# Patient Record
Sex: FEMALE | Race: WHITE | Employment: UNEMPLOYED | URBAN - METROPOLITAN AREA
[De-identification: names, ages, dates, MRNs, and addresses within clinical notes are randomized per-mention and may not be internally consistent; named-entity substitution may affect disease eponyms.]

---

## 2019-06-06 ENCOUNTER — OFFICE VISIT (OUTPATIENT)
Dept: OBGYN | Age: 59
End: 2019-06-06
Payer: MEDICARE

## 2019-06-06 ENCOUNTER — HOSPITAL ENCOUNTER (OUTPATIENT)
Age: 59
Setting detail: SPECIMEN
Discharge: HOME | End: 2019-06-06
Payer: MEDICARE

## 2019-06-06 VITALS
BODY MASS INDEX: 32.94 KG/M2 | SYSTOLIC BLOOD PRESSURE: 108 MMHG | DIASTOLIC BLOOD PRESSURE: 70 MMHG | WEIGHT: 167.8 LBS | HEIGHT: 60 IN

## 2019-06-06 DIAGNOSIS — Z12.31 ENCOUNTER FOR SCREENING MAMMOGRAM FOR BREAST CANCER: Primary | ICD-10-CM

## 2019-06-06 DIAGNOSIS — Z01.419 WELL FEMALE EXAM WITH ROUTINE GYNECOLOGICAL EXAM: ICD-10-CM

## 2019-06-06 PROCEDURE — 99386 PREV VISIT NEW AGE 40-64: CPT | Performed by: OBSTETRICS & GYNECOLOGY

## 2019-06-06 PROCEDURE — G0145 SCR C/V CYTO,THINLAYER,RESCR: HCPCS

## 2019-06-06 RX ORDER — PANTOPRAZOLE SODIUM 40 MG/1
TABLET, DELAYED RELEASE ORAL
Refills: 10 | COMMUNITY
Start: 2019-05-19

## 2019-06-06 RX ORDER — OXYCODONE HYDROCHLORIDE AND ACETAMINOPHEN 5; 325 MG/1; MG/1
TABLET ORAL PRN
Refills: 0 | COMMUNITY
Start: 2019-05-12

## 2019-06-06 RX ORDER — TAMSULOSIN HYDROCHLORIDE 0.4 MG/1
CAPSULE ORAL
Refills: 1 | COMMUNITY
Start: 2019-05-19

## 2019-06-06 RX ORDER — CETIRIZINE HYDROCHLORIDE 10 MG/1
TABLET ORAL
Refills: 3 | COMMUNITY
Start: 2019-05-19

## 2019-06-06 RX ORDER — CLOPIDOGREL BISULFATE 75 MG/1
75 TABLET ORAL NIGHTLY
Refills: 11 | COMMUNITY
Start: 2019-05-19

## 2019-06-06 RX ORDER — METOPROLOL TARTRATE 100 MG/1
100 TABLET ORAL 2 TIMES DAILY
Refills: 2 | COMMUNITY
Start: 2019-05-19

## 2019-06-06 RX ORDER — TOPIRAMATE 50 MG/1
TABLET, FILM COATED ORAL
Refills: 11 | COMMUNITY
Start: 2019-05-19

## 2019-06-06 RX ORDER — ATORVASTATIN CALCIUM 80 MG/1
80 TABLET, FILM COATED ORAL NIGHTLY
Refills: 11 | COMMUNITY
Start: 2019-05-19

## 2019-06-06 NOTE — PROGRESS NOTES
YEARLY PHYSICAL    Date of service: 2019    Ladarius Larios  Is a 64 y.o.   female    PT's PCP is: No primary care provider on file. : [id number]                                             Subjective:       No LMP recorded. Patient is postmenopausal.     Are your menses regular: not applicable    OB History    Para Term  AB Living   5 5 4 1   5   SAB TAB Ectopic Molar Multiple Live Births             5      # Outcome Date GA Lbr Henrik/2nd Weight Sex Delivery Anes PTL Lv   5 Term  37w0d   M Vag-Spont   ANNIE   4 Term 67 37w0d   M Vag-Spont   ANNIE   3 Term 48 37w0d   F Vag-Spont   ANNIE   2  28 34w0d   F Vag-Spont   ANNIE   1 Term 0 37w0d   F Vag-Spont   ANNIE        Social History     Tobacco Use   Smoking Status Former Smoker   Smokeless Tobacco Never Used        Social History     Substance and Sexual Activity   Alcohol Use Not Currently       Family History   Problem Relation Age of Onset    High Cholesterol Father     Brain Cancer Maternal Grandfather     Other Other         No family hx stroke or breast cancer       Any family history of breast or ovarian cancer: Yes    Any family history of blood clots: No      Allergies: Patient has no known allergies. Current Outpatient Medications:     atorvastatin (LIPITOR) 80 MG tablet, Take 80 mg by mouth nightly, Disp: , Rfl: 11    clopidogrel (PLAVIX) 75 MG tablet, Take 75 mg by mouth nightly, Disp: , Rfl: 11    cetirizine (ZYRTEC) 10 MG tablet, TAKE 1 TABLET BY MOUTH AT BEDTIME, Disp: , Rfl: 3    metoprolol (LOPRESSOR) 100 MG tablet, Take 100 mg by mouth 2 times daily, Disp: , Rfl: 2    oxyCODONE-acetaminophen (PERCOCET) 5-325 MG per tablet, as needed. , Disp: , Rfl: 0    pantoprazole (PROTONIX) 40 MG tablet, TAKE 1 TABLET BY MOUTH DAILY, Disp: , Rfl: 10    topiramate (TOPAMAX) 50 MG tablet, TAKE 1 TABLET BY MOUTH TWICE DAILY, Disp: , Rfl: 11   GENITAL/URINARY:  External Genitalia:  General appearance; normal, Hair distribution; normal, Lesions absent  Vagina:  General appearance normal, Estrogen effect normal, Discharge absent, Lesions absent, Pelvic support normal  Cervix:  General appearance normal, Lesions absent, Discharge absent, Tenderness absent, Enlargement absent, Nodularity absent  Uterus:  Size normal, Contour normal, Position normal, Masses absent, Consistency; normal, Support normal, Tenderness absent  [place]:   Masses absent, Tenderness absent, Enlargement absent, Nodularity absent                                    Vaginal discharge: no vaginal discharge   Assessment/plan: Routine care

## 2019-06-11 LAB — CYTOLOGY REPORT: NORMAL

## 2020-06-11 ENCOUNTER — TELEPHONE (OUTPATIENT)
Dept: OBGYN | Age: 60
End: 2020-06-11

## 2023-11-22 ENCOUNTER — HOSPITAL ENCOUNTER (EMERGENCY)
Age: 63
Discharge: HOME | End: 2023-11-22
Payer: MEDICAID

## 2023-11-22 VITALS — BODY MASS INDEX: 28.9 KG/M2

## 2023-11-22 VITALS
SYSTOLIC BLOOD PRESSURE: 157 MMHG | TEMPERATURE: 98.3 F | HEART RATE: 79 BPM | DIASTOLIC BLOOD PRESSURE: 75 MMHG | RESPIRATION RATE: 18 BRPM

## 2023-11-22 DIAGNOSIS — Z79.02: ICD-10-CM

## 2023-11-22 DIAGNOSIS — Z95.5: ICD-10-CM

## 2023-11-22 DIAGNOSIS — Z79.899: ICD-10-CM

## 2023-11-22 DIAGNOSIS — K21.9: ICD-10-CM

## 2023-11-22 DIAGNOSIS — K29.70: ICD-10-CM

## 2023-11-22 DIAGNOSIS — I25.10: ICD-10-CM

## 2023-11-22 DIAGNOSIS — E87.6: Primary | ICD-10-CM

## 2023-11-22 DIAGNOSIS — R10.13: ICD-10-CM

## 2023-11-22 LAB
ADD MANUAL DIFF: NO
ALANINE AMINOTRANSFERASE: 94 U/L (ref 14–59)
ALBUMIN GLOBULIN RATIO: 1.1
ALBUMIN LEVEL: 3.5 G/DL (ref 3.4–5)
ALKALINE PHOSPHATASE: 190 U/L (ref 46–116)
ANION GAP: 10.1
ASPARTATE AMINO TRANSFERASE: 123 U/L (ref 15–37)
BLOOD UREA NITROGEN: 18 MG/DL (ref 7–18)
CALCIUM: 8.5 MG/DL (ref 8.5–10.1)
CARBON DIOXIDE: 27.1 MMOL/L (ref 21–32)
CHLORIDE: 108 MMOL/L (ref 98–107)
CO2 BLD-SCNC: 27.1 MMOL/L (ref 21–32)
ESTIMATED GFR (AFRICAN AMERICA: >60 (ref 60–?)
ESTIMATED GFR (NON-AFRICAN AME: >60 (ref 60–?)
GLOBULIN: 3.3 G/DL
GLUCOSE BLD-MCNC: 118 MG/DL (ref 74–106)
HCT VFR BLD CALC: 38 % (ref 36–48)
HEMATOCRIT: 38 % (ref 36–48)
HEMOGLOBIN: 12.8 G/DL (ref 12–16)
IMMATURE GRANULOCYTES ABS AUTO: 0.02 10^3/UL (ref 0–0.03)
IMMATURE GRANULOCYTES PCT AUTO: 0.4 % (ref 0–0.5)
LIPASE: 34 U/L (ref 16–77)
LYMPHOCYTES  ABSOLUTE AUTO: 1.5 10^3/UL (ref 1.2–3.8)
MCV RBC: 90.5 FL (ref 81–99)
MEAN CORPUSCULAR HEMOGLOBIN: 30.5 PG (ref 26.7–34)
MEAN CORPUSCULAR HGB CONC: 33.7 G/DL (ref 29.9–35.2)
MEAN CORPUSCULAR VOLUME: 90.5 FL (ref 81–99)
PLATELET # BLD: 221 10^3/UL (ref 150–450)
PLATELET COUNT: 221 10^3/UL (ref 150–450)
POTASSIUM SERPLBLD-SCNC: 3.2 MMOL/L (ref 3.5–5.1)
POTASSIUM: 3.2 MMOL/L (ref 3.5–5.1)
RED BLOOD COUNT: 4.2 10^6/UL (ref 4.2–5.4)
SODIUM BLD-SCNC: 142 MMOL/L (ref 136–145)
SODIUM: 142 MMOL/L (ref 136–145)
TOTAL PROTEIN: 6.8 G/DL (ref 6.4–8.2)
WBC # BLD: 5.3 10^3/UL (ref 4–11)
WHITE BLOOD COUNT: 5.3 10^3/UL (ref 4–11)

## 2023-11-22 PROCEDURE — 83690 ASSAY OF LIPASE: CPT

## 2023-11-22 PROCEDURE — 84484 ASSAY OF TROPONIN QUANT: CPT

## 2023-11-22 PROCEDURE — 99284 EMERGENCY DEPT VISIT MOD MDM: CPT

## 2023-11-22 PROCEDURE — 80053 COMPREHEN METABOLIC PANEL: CPT

## 2023-11-22 PROCEDURE — 85025 COMPLETE CBC W/AUTO DIFF WBC: CPT

## 2023-11-22 PROCEDURE — 36415 COLL VENOUS BLD VENIPUNCTURE: CPT

## 2023-11-22 PROCEDURE — 93005 ELECTROCARDIOGRAM TRACING: CPT

## 2024-07-30 ENCOUNTER — HOSPITAL ENCOUNTER (EMERGENCY)
Age: 64
Discharge: HOME | End: 2024-07-30
Payer: MEDICAID

## 2024-07-30 VITALS — OXYGEN SATURATION: 96 % | HEART RATE: 89 BPM

## 2024-07-30 VITALS — HEART RATE: 76 BPM | OXYGEN SATURATION: 100 %

## 2024-07-30 VITALS
DIASTOLIC BLOOD PRESSURE: 78 MMHG | OXYGEN SATURATION: 99 % | SYSTOLIC BLOOD PRESSURE: 165 MMHG | TEMPERATURE: 97.88 F | HEART RATE: 74 BPM

## 2024-07-30 VITALS — OXYGEN SATURATION: 98 % | HEART RATE: 79 BPM

## 2024-07-30 VITALS — DIASTOLIC BLOOD PRESSURE: 78 MMHG | HEART RATE: 81 BPM | SYSTOLIC BLOOD PRESSURE: 165 MMHG | OXYGEN SATURATION: 99 %

## 2024-07-30 VITALS — HEART RATE: 92 BPM | OXYGEN SATURATION: 93 %

## 2024-07-30 VITALS — SYSTOLIC BLOOD PRESSURE: 116 MMHG | HEART RATE: 76 BPM | DIASTOLIC BLOOD PRESSURE: 76 MMHG

## 2024-07-30 VITALS — HEART RATE: 86 BPM | OXYGEN SATURATION: 99 %

## 2024-07-30 VITALS — SYSTOLIC BLOOD PRESSURE: 143 MMHG | OXYGEN SATURATION: 96 % | HEART RATE: 79 BPM | DIASTOLIC BLOOD PRESSURE: 97 MMHG

## 2024-07-30 VITALS — DIASTOLIC BLOOD PRESSURE: 88 MMHG | SYSTOLIC BLOOD PRESSURE: 151 MMHG | HEART RATE: 82 BPM

## 2024-07-30 VITALS — OXYGEN SATURATION: 99 % | HEART RATE: 82 BPM

## 2024-07-30 VITALS — OXYGEN SATURATION: 100 % | HEART RATE: 80 BPM

## 2024-07-30 VITALS — HEART RATE: 91 BPM

## 2024-07-30 VITALS — DIASTOLIC BLOOD PRESSURE: 84 MMHG | SYSTOLIC BLOOD PRESSURE: 148 MMHG | HEART RATE: 75 BPM | OXYGEN SATURATION: 97 %

## 2024-07-30 VITALS — OXYGEN SATURATION: 98 % | HEART RATE: 74 BPM

## 2024-07-30 VITALS — OXYGEN SATURATION: 100 % | HEART RATE: 74 BPM

## 2024-07-30 VITALS — HEART RATE: 85 BPM | DIASTOLIC BLOOD PRESSURE: 77 MMHG | OXYGEN SATURATION: 97 % | SYSTOLIC BLOOD PRESSURE: 121 MMHG

## 2024-07-30 VITALS — HEART RATE: 83 BPM | SYSTOLIC BLOOD PRESSURE: 114 MMHG | DIASTOLIC BLOOD PRESSURE: 71 MMHG

## 2024-07-30 VITALS — OXYGEN SATURATION: 97 % | HEART RATE: 88 BPM

## 2024-07-30 VITALS — OXYGEN SATURATION: 97 %

## 2024-07-30 VITALS — OXYGEN SATURATION: 99 % | HEART RATE: 83 BPM

## 2024-07-30 VITALS — HEART RATE: 74 BPM | OXYGEN SATURATION: 100 %

## 2024-07-30 VITALS — DIASTOLIC BLOOD PRESSURE: 73 MMHG | OXYGEN SATURATION: 98 % | HEART RATE: 85 BPM | SYSTOLIC BLOOD PRESSURE: 124 MMHG

## 2024-07-30 VITALS — HEART RATE: 86 BPM | OXYGEN SATURATION: 100 %

## 2024-07-30 VITALS — HEART RATE: 82 BPM | OXYGEN SATURATION: 99 %

## 2024-07-30 VITALS — BODY MASS INDEX: 29.3 KG/M2

## 2024-07-30 DIAGNOSIS — Z79.02: ICD-10-CM

## 2024-07-30 DIAGNOSIS — R10.13: Primary | ICD-10-CM

## 2024-07-30 DIAGNOSIS — I25.10: ICD-10-CM

## 2024-07-30 DIAGNOSIS — Z79.899: ICD-10-CM

## 2024-07-30 LAB
ADD MANUAL DIFF: NO
ALANINE AMINOTRANSFERASE: 82 U/L (ref 14–59)
ALBUMIN GLOBULIN RATIO: 1.1
ALBUMIN LEVEL: 3.6 G/DL (ref 3.4–5)
ALKALINE PHOSPHATASE: 133 U/L (ref 46–116)
ANION GAP: 10.1
ASPARTATE AMINO TRANSFERASE: 88 U/L (ref 15–37)
BLOOD UREA NITROGEN: 14 MG/DL (ref 7–18)
CALCIUM: 8.8 MG/DL (ref 8.5–10.1)
CARBON DIOXIDE: 27.4 MMOL/L (ref 21–32)
CHLORIDE: 106 MMOL/L (ref 98–107)
CO2 BLD-SCNC: 27.4 MMOL/L (ref 21–32)
ESTIMATED GFR (AFRICAN AMERICA: >60 (ref 60–?)
ESTIMATED GFR (NON-AFRICAN AME: >60 (ref 60–?)
GLOBULIN: 3.4 G/DL
GLUCOSE BLD-MCNC: 110 MG/DL (ref 74–106)
HCT VFR BLD CALC: 41.3 % (ref 36–48)
HEMATOCRIT: 41.3 % (ref 36–48)
HEMOGLOBIN: 14 G/DL (ref 12–16)
IMMATURE GRANULOCYTES ABS AUTO: 0.02 10^3/UL (ref 0–0.03)
IMMATURE GRANULOCYTES PCT AUTO: 0.3 % (ref 0–0.5)
LYMPHOCYTES  ABSOLUTE AUTO: 1.4 10^3/UL (ref 1.2–3.8)
MCV RBC: 90 FL (ref 81–99)
MEAN CORPUSCULAR HEMOGLOBIN: 30.5 PG (ref 26.7–34)
MEAN CORPUSCULAR HGB CONC: 33.9 G/DL (ref 29.9–35.2)
MEAN CORPUSCULAR VOLUME: 90 FL (ref 81–99)
NT PRO B TYPE NATRIURETIC PEPT: 208 PG/ML (ref ?–900)
PLATELET # BLD: 245 10^3/UL (ref 150–450)
PLATELET COUNT: 245 10^3/UL (ref 150–450)
POTASSIUM SERPLBLD-SCNC: 3.5 MMOL/L (ref 3.5–5.1)
POTASSIUM: 3.5 MMOL/L (ref 3.5–5.1)
RED BLOOD COUNT: 4.59 10^6/UL (ref 4.2–5.4)
SODIUM BLD-SCNC: 140 MMOL/L (ref 136–145)
SODIUM: 140 MMOL/L (ref 136–145)
TOTAL PROTEIN: 7 G/DL (ref 6.4–8.2)
WBC # BLD: 7.3 10^3/UL (ref 4–11)
WHITE BLOOD COUNT: 7.3 10^3/UL (ref 4–11)

## 2024-07-30 PROCEDURE — 84484 ASSAY OF TROPONIN QUANT: CPT

## 2024-07-30 PROCEDURE — 96374 THER/PROPH/DIAG INJ IV PUSH: CPT

## 2024-07-30 PROCEDURE — 71045 X-RAY EXAM CHEST 1 VIEW: CPT

## 2024-07-30 PROCEDURE — 99285 EMERGENCY DEPT VISIT HI MDM: CPT

## 2024-07-30 PROCEDURE — 93005 ELECTROCARDIOGRAM TRACING: CPT

## 2024-07-30 PROCEDURE — 83880 ASSAY OF NATRIURETIC PEPTIDE: CPT

## 2024-07-30 PROCEDURE — 85378 FIBRIN DEGRADE SEMIQUANT: CPT

## 2024-07-30 PROCEDURE — 36415 COLL VENOUS BLD VENIPUNCTURE: CPT

## 2024-07-30 PROCEDURE — 80053 COMPREHEN METABOLIC PANEL: CPT

## 2024-07-30 PROCEDURE — 85025 COMPLETE CBC W/AUTO DIFF WBC: CPT

## 2025-04-02 ENCOUNTER — HOSPITAL ENCOUNTER
Dept: HOSPITAL 101 - US | Age: 65
Discharge: HOME | End: 2025-04-02
Payer: MEDICAID

## 2025-04-02 DIAGNOSIS — Z80.42: ICD-10-CM

## 2025-04-02 DIAGNOSIS — Z80.1: ICD-10-CM

## 2025-04-02 DIAGNOSIS — Z12.31: Primary | ICD-10-CM

## 2025-04-02 DIAGNOSIS — E07.89: ICD-10-CM

## 2025-04-02 DIAGNOSIS — R13.10: ICD-10-CM

## 2025-04-02 PROCEDURE — 77063 BREAST TOMOSYNTHESIS BI: CPT

## 2025-04-02 PROCEDURE — 76536 US EXAM OF HEAD AND NECK: CPT

## 2025-04-02 PROCEDURE — 77067 SCR MAMMO BI INCL CAD: CPT
